# Patient Record
Sex: MALE | Race: BLACK OR AFRICAN AMERICAN | NOT HISPANIC OR LATINO | Employment: UNEMPLOYED | URBAN - METROPOLITAN AREA
[De-identification: names, ages, dates, MRNs, and addresses within clinical notes are randomized per-mention and may not be internally consistent; named-entity substitution may affect disease eponyms.]

---
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
## 2024-04-18 ENCOUNTER — HOSPITAL ENCOUNTER (EMERGENCY)
Facility: HOSPITAL | Age: 19
Discharge: HOME | End: 2024-04-18
Attending: EMERGENCY MEDICINE
Payer: COMMERCIAL

## 2024-04-18 VITALS
RESPIRATION RATE: 18 BRPM | HEIGHT: 72 IN | BODY MASS INDEX: 23.57 KG/M2 | HEART RATE: 62 BPM | OXYGEN SATURATION: 98 % | WEIGHT: 174 LBS | SYSTOLIC BLOOD PRESSURE: 140 MMHG | TEMPERATURE: 98 F | DIASTOLIC BLOOD PRESSURE: 80 MMHG

## 2024-04-18 DIAGNOSIS — K08.89 TOOTHACHE: Primary | ICD-10-CM

## 2024-04-18 PROCEDURE — 63600175 PHARM REV CODE 636 W HCPCS: Performed by: PHYSICIAN ASSISTANT

## 2024-04-18 PROCEDURE — 96372 THER/PROPH/DIAG INJ SC/IM: CPT | Performed by: PHYSICIAN ASSISTANT

## 2024-04-18 PROCEDURE — 99284 EMERGENCY DEPT VISIT MOD MDM: CPT | Mod: 25

## 2024-04-18 PROCEDURE — 25000003 PHARM REV CODE 250: Performed by: PHYSICIAN ASSISTANT

## 2024-04-18 RX ORDER — AMOXICILLIN AND CLAVULANATE POTASSIUM 875; 125 MG/1; MG/1
1 TABLET, FILM COATED ORAL
Status: COMPLETED | OUTPATIENT
Start: 2024-04-18 | End: 2024-04-18

## 2024-04-18 RX ORDER — CHLORHEXIDINE GLUCONATE ORAL RINSE 1.2 MG/ML
15 SOLUTION DENTAL 2 TIMES DAILY
Qty: 300 ML | Refills: 0 | Status: SHIPPED | OUTPATIENT
Start: 2024-04-18 | End: 2024-04-28

## 2024-04-18 RX ORDER — KETOROLAC TROMETHAMINE 30 MG/ML
30 INJECTION, SOLUTION INTRAMUSCULAR; INTRAVENOUS
Status: COMPLETED | OUTPATIENT
Start: 2024-04-18 | End: 2024-04-18

## 2024-04-18 RX ORDER — AMOXICILLIN AND CLAVULANATE POTASSIUM 875; 125 MG/1; MG/1
1 TABLET, FILM COATED ORAL 2 TIMES DAILY
Qty: 14 TABLET | Refills: 0 | Status: SHIPPED | OUTPATIENT
Start: 2024-04-18 | End: 2024-04-25

## 2024-04-18 RX ORDER — DROPERIDOL 2.5 MG/ML
2.5 INJECTION, SOLUTION INTRAMUSCULAR; INTRAVENOUS ONCE
Status: COMPLETED | OUTPATIENT
Start: 2024-04-18 | End: 2024-04-18

## 2024-04-18 RX ORDER — ACETAMINOPHEN 500 MG
1000 TABLET ORAL
Status: COMPLETED | OUTPATIENT
Start: 2024-04-18 | End: 2024-04-18

## 2024-04-18 RX ADMIN — DROPERIDOL 2.5 MG: 2.5 INJECTION, SOLUTION INTRAMUSCULAR; INTRAVENOUS at 02:04

## 2024-04-18 RX ADMIN — ACETAMINOPHEN 1000 MG: 500 TABLET ORAL at 02:04

## 2024-04-18 RX ADMIN — AMOXICILLIN AND CLAVULANATE POTASSIUM 1 TABLET: 875; 125 TABLET, FILM COATED ORAL at 02:04

## 2024-04-18 RX ADMIN — KETOROLAC TROMETHAMINE 30 MG: 30 INJECTION, SOLUTION INTRAMUSCULAR at 02:04

## 2024-04-18 NOTE — DISCHARGE INSTRUCTIONS
Please follow-up with a dentist for re-evaluation and definitive care.    Peridex mouthwash twice daily.  Take antibiotics twice daily as prescribed, try to take with meals to limit nausea.  Continue with ibuprofen, Tylenol as needed for pain.    Return to this ED if you develop facial or neck swelling, if unable to open his mouth, if unable to eat or drink, if he develops fever, if any other problems occur.

## 2024-04-18 NOTE — ED PROVIDER NOTES
Encounter Date: 4/18/2024       History     Chief Complaint   Patient presents with    Dental Pain     Pt presents to ED c/o dental pain right lower onset today.  Denies any other symptoms.  Reports taking IBU around 2030 with no relief.   Pain 10/10.       18-year-old male smoker presents to ED complaining of severe right lower molar dental pain since this evening.    Has had issues with the tooth in the past, it is cracked.  Missed a dental appointment.  Touched it with his tongue earlier this evening, began with severe pain. No relief with Ibuprofen or Orajel used earlier this evening.  Denies fever.  No facial swelling.  No neck pain or stiffness.  No obvious intraoral abscess.  Symptoms are acute, constant, moderate.  Denies any alleviating factors.  No radiation of symptoms.    Regular marijuana use      Review of patient's allergies indicates:  No Known Allergies  No past medical history on file.  No past surgical history on file.  No family history on file.     Review of Systems   Constitutional:  Negative for fever.   HENT:  Positive for dental problem. Negative for facial swelling.    Gastrointestinal:  Negative for nausea and vomiting.   Skin:  Negative for rash and wound.   Neurological:  Negative for syncope.       Physical Exam     Initial Vitals [04/18/24 0057]   BP Pulse Resp Temp SpO2   (!) 154/92 65 20 98.1 °F (36.7 °C) 98 %      MAP       --         Physical Exam    Nursing note and vitals reviewed.  Constitutional: He appears well-developed and well-nourished. He is not diaphoretic. No distress.   HENT:   Tooth #32 cracked posteriorly, mild associated gum edema, exquisite ttp. No perialveolar swelling or fluctuance. Sublingual area soft.  Jaw with full range of motion without discomfort or difficulty.  No overlying facial swelling.   Neck: Neck supple.   Normal range of motion.  Musculoskeletal:      Cervical back: Normal range of motion and neck supple.     Lymphadenopathy:     He has no  cervical adenopathy.   Psychiatric: Thought content normal.   Anxious, restless, shivering         ED Course   Procedures  Labs Reviewed - No data to display       Imaging Results    None          Medications   droPERidol injection 2.5 mg (2.5 mg Intramuscular Given 4/18/24 0219)   ketorolac injection 30 mg (30 mg Intramuscular Given 4/18/24 0216)   acetaminophen tablet 1,000 mg (1,000 mg Oral Given 4/18/24 0215)   amoxicillin-clavulanate 875-125mg per tablet 1 tablet (1 tablet Oral Given 4/18/24 0216)     Medical Decision Making  Differential diagnosis:  Anxiety disorder, odontalgia, dental abscess    Amount and/or Complexity of Data Reviewed  Discussion of management or test interpretation with external provider(s): Given droperidol given severe anxiety, question if related to chronic marijuana use.    Resting comfortably on re-evaluation.  Discharged in stable condition.    Risk  OTC drugs.  Prescription drug management.               ED Course as of 04/18/24 0519   Thu Apr 18, 2024   0231 Dad plans to follow-up schedule appointment with dentist tomorrow morning; they already have a dentist. [SM]      ED Course User Index  [SM] Barrera Anglin PA-C                           Clinical Impression:  Final diagnoses:  [K08.89] Toothache (Primary)          ED Disposition Condition    Discharge Stable          ED Prescriptions       Medication Sig Dispense Start Date End Date Auth. Provider    amoxicillin-clavulanate 875-125mg (AUGMENTIN) 875-125 mg per tablet Take 1 tablet by mouth 2 (two) times daily. for 7 days 14 tablet 4/18/2024 4/25/2024 Barrera Anglin PA-C    chlorhexidine (PERIDEX) 0.12 % solution Use as directed 15 mLs in the mouth or throat 2 (two) times daily. for 10 days 300 mL 4/18/2024 4/28/2024 Barrera Anglin PA-C          Follow-up Information       Follow up With Specialties Details Why Contact Info    Dentist  Schedule an appointment as soon as possible for a visit  For reevaluation               Barrera Anglin, PA-MYRA  04/18/24 0519

## 2024-04-18 NOTE — ED TRIAGE NOTES
17 yo male presents to the ED, escorted by his father, with c/o dental pain. Pt is AAO x 4 upon assessment; reports that he began experiencing dental pain around 2000 last night. Pt denies any other pain or s/s; rates pain at 10 on a PRS of 0-10.    plan of care explained